# Patient Record
Sex: FEMALE | Race: WHITE | NOT HISPANIC OR LATINO
[De-identification: names, ages, dates, MRNs, and addresses within clinical notes are randomized per-mention and may not be internally consistent; named-entity substitution may affect disease eponyms.]

---

## 2018-03-13 ENCOUNTER — TRANSCRIPTION ENCOUNTER (OUTPATIENT)
Age: 33
End: 2018-03-13

## 2019-08-14 ENCOUNTER — TRANSCRIPTION ENCOUNTER (OUTPATIENT)
Age: 34
End: 2019-08-14

## 2019-08-14 ENCOUNTER — APPOINTMENT (OUTPATIENT)
Dept: UROLOGY | Facility: CLINIC | Age: 34
End: 2019-08-14

## 2020-04-09 ENCOUNTER — APPOINTMENT (OUTPATIENT)
Dept: DISASTER EMERGENCY | Facility: CLINIC | Age: 35
End: 2020-04-09
Payer: COMMERCIAL

## 2020-04-09 DIAGNOSIS — R05 COUGH: ICD-10-CM

## 2020-04-09 DIAGNOSIS — R68.89 OTHER GENERAL SYMPTOMS AND SIGNS: ICD-10-CM

## 2020-04-09 DIAGNOSIS — R73.03 PREDIABETES.: ICD-10-CM

## 2020-04-09 PROCEDURE — 99213 OFFICE O/P EST LOW 20 MIN: CPT

## 2020-04-09 NOTE — HISTORY OF PRESENT ILLNESS
[Patient presents to the office today for COVID-19 evaluation and testing.] : Patient presents to the office today for COVID-19 evaluation and testing. [Patient has been pre-screened by RN at call center for appointment today with our facility.] : Patient has been pre-screened by RN at call center for appointment today with our facility. 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 Patient told to seek emergent care if symptoms occur. [FreeTextEntry1] :  presents today to be tested for COVID-19\par Reports dry cough, SOB, myalgia  for last 6-7   days.\par Denies CP, SOB, MEJIA, palpitations, lightheadedness, fever, chills, nausea, vomiting, diarrhea, or recent travel. Patient refused VS, stated "I just checked my temp", she stated it was 97.0 F.\par  [TextBox_66] : None [TextBox_107] : -Likely Dx. 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 Strict handwashing, coughing and sneezing into the elbow, maintaining social distancing, using face mask or face shield when outside and other precautionary measures strongly advised.\par -To call 911 for acute onset of SOB, persistent dyspnea on exertion and other acute distress. \par -Questions answered with expressions of understanding.\par -Pt to be notified by Peggy with results.\par -To follow with PMD \par -Work note provided upon request.\par \par \par \par

## 2020-04-11 LAB — SARS-COV-2 N GENE NPH QL NAA+PROBE: NOT DETECTED

## 2020-04-22 ENCOUNTER — APPOINTMENT (OUTPATIENT)
Dept: DISASTER EMERGENCY | Facility: CLINIC | Age: 35
End: 2020-04-22

## 2020-05-12 PROBLEM — R05 COUGH IN ADULT: Status: ACTIVE | Noted: 2020-05-12

## 2020-05-12 PROBLEM — R68.89 SUSPECTED COVID-19 VIRUS INFECTION: Status: ACTIVE | Noted: 2020-04-09

## 2020-12-14 ENCOUNTER — TRANSCRIPTION ENCOUNTER (OUTPATIENT)
Age: 35
End: 2020-12-14

## 2022-04-12 ENCOUNTER — APPOINTMENT (OUTPATIENT)
Dept: BURN CARE | Facility: CLINIC | Age: 37
End: 2022-04-12

## 2022-04-21 ENCOUNTER — APPOINTMENT (OUTPATIENT)
Dept: BURN CARE | Facility: CLINIC | Age: 37
End: 2022-04-21